# Patient Record
Sex: FEMALE | HISPANIC OR LATINO | Employment: OTHER | URBAN - METROPOLITAN AREA
[De-identification: names, ages, dates, MRNs, and addresses within clinical notes are randomized per-mention and may not be internally consistent; named-entity substitution may affect disease eponyms.]

---

## 2023-08-19 ENCOUNTER — HOSPITAL ENCOUNTER (OUTPATIENT)
Facility: CLINIC | Age: 78
Setting detail: OBSERVATION
Discharge: HOME | End: 2023-08-20
Attending: EMERGENCY MEDICINE | Admitting: STUDENT IN AN ORGANIZED HEALTH CARE EDUCATION/TRAINING PROGRAM
Payer: COMMERCIAL

## 2023-08-19 ENCOUNTER — OFFICE VISIT (OUTPATIENT)
Dept: OPHTHALMOLOGY | Facility: CLINIC | Age: 78
End: 2023-08-19
Payer: COMMERCIAL

## 2023-08-19 DIAGNOSIS — L03.213 PRESEPTAL CELLULITIS OF RIGHT EYE: Primary | ICD-10-CM

## 2023-08-19 LAB
ALBUMIN SERPL BCG-MCNC: 4.8 G/DL (ref 3.5–5.2)
ALP SERPL-CCNC: 156 U/L (ref 35–104)
ALT SERPL W P-5'-P-CCNC: 28 U/L (ref 0–50)
ANION GAP SERPL CALCULATED.3IONS-SCNC: 15 MMOL/L (ref 7–15)
AST SERPL W P-5'-P-CCNC: 28 U/L (ref 0–45)
BASOPHILS # BLD AUTO: 0 10E3/UL (ref 0–0.2)
BASOPHILS NFR BLD AUTO: 0 %
BILIRUB SERPL-MCNC: 0.4 MG/DL
BUN SERPL-MCNC: 10.3 MG/DL (ref 8–23)
CALCIUM SERPL-MCNC: 9.9 MG/DL (ref 8.8–10.2)
CHLORIDE SERPL-SCNC: 99 MMOL/L (ref 98–107)
CREAT SERPL-MCNC: 0.76 MG/DL (ref 0.51–0.95)
DEPRECATED HCO3 PLAS-SCNC: 23 MMOL/L (ref 22–29)
EOSINOPHIL # BLD AUTO: 0.2 10E3/UL (ref 0–0.7)
EOSINOPHIL NFR BLD AUTO: 2 %
ERYTHROCYTE [DISTWIDTH] IN BLOOD BY AUTOMATED COUNT: 13.4 % (ref 10–15)
GFR SERPL CREATININE-BSD FRML MDRD: 79 ML/MIN/1.73M2
GLUCOSE SERPL-MCNC: 152 MG/DL (ref 70–99)
HCT VFR BLD AUTO: 40.7 % (ref 35–47)
HGB BLD-MCNC: 13.3 G/DL (ref 11.7–15.7)
HOLD SPECIMEN: NORMAL
HOLD SPECIMEN: NORMAL
IMM GRANULOCYTES # BLD: 0 10E3/UL
IMM GRANULOCYTES NFR BLD: 0 %
LYMPHOCYTES # BLD AUTO: 2.3 10E3/UL (ref 0.8–5.3)
LYMPHOCYTES NFR BLD AUTO: 30 %
MCH RBC QN AUTO: 29.8 PG (ref 26.5–33)
MCHC RBC AUTO-ENTMCNC: 32.7 G/DL (ref 31.5–36.5)
MCV RBC AUTO: 91 FL (ref 78–100)
MONOCYTES # BLD AUTO: 0.6 10E3/UL (ref 0–1.3)
MONOCYTES NFR BLD AUTO: 8 %
NEUTROPHILS # BLD AUTO: 4.7 10E3/UL (ref 1.6–8.3)
NEUTROPHILS NFR BLD AUTO: 60 %
NRBC # BLD AUTO: 0 10E3/UL
NRBC BLD AUTO-RTO: 0 /100
PLATELET # BLD AUTO: 331 10E3/UL (ref 150–450)
POTASSIUM SERPL-SCNC: 4.1 MMOL/L (ref 3.4–5.3)
PROT SERPL-MCNC: 8.7 G/DL (ref 6.4–8.3)
RBC # BLD AUTO: 4.46 10E6/UL (ref 3.8–5.2)
SODIUM SERPL-SCNC: 137 MMOL/L (ref 136–145)
WBC # BLD AUTO: 7.8 10E3/UL (ref 4–11)

## 2023-08-19 PROCEDURE — 80053 COMPREHEN METABOLIC PANEL: CPT | Performed by: EMERGENCY MEDICINE

## 2023-08-19 PROCEDURE — 85025 COMPLETE CBC W/AUTO DIFF WBC: CPT | Performed by: EMERGENCY MEDICINE

## 2023-08-19 PROCEDURE — 250N000013 HC RX MED GY IP 250 OP 250 PS 637

## 2023-08-19 PROCEDURE — 36415 COLL VENOUS BLD VENIPUNCTURE: CPT | Performed by: EMERGENCY MEDICINE

## 2023-08-19 PROCEDURE — 96365 THER/PROPH/DIAG IV INF INIT: CPT

## 2023-08-19 PROCEDURE — 250N000011 HC RX IP 250 OP 636: Performed by: EMERGENCY MEDICINE

## 2023-08-19 PROCEDURE — 99285 EMERGENCY DEPT VISIT HI MDM: CPT | Performed by: EMERGENCY MEDICINE

## 2023-08-19 PROCEDURE — 99207 PR SERVICE NOT STAFFED W/SUPERV PROV: CPT | Mod: GC | Performed by: STUDENT IN AN ORGANIZED HEALTH CARE EDUCATION/TRAINING PROGRAM

## 2023-08-19 PROCEDURE — 120N000002 HC R&B MED SURG/OB UMMC

## 2023-08-19 PROCEDURE — 87040 BLOOD CULTURE FOR BACTERIA: CPT | Performed by: EMERGENCY MEDICINE

## 2023-08-19 PROCEDURE — 250N000011 HC RX IP 250 OP 636

## 2023-08-19 PROCEDURE — 99222 1ST HOSP IP/OBS MODERATE 55: CPT | Mod: GC | Performed by: HOSPITALIST

## 2023-08-19 PROCEDURE — 250N000009 HC RX 250

## 2023-08-19 RX ORDER — DEXTROSE MONOHYDRATE 25 G/50ML
25-50 INJECTION, SOLUTION INTRAVENOUS
Status: DISCONTINUED | OUTPATIENT
Start: 2023-08-19 | End: 2023-08-20 | Stop reason: HOSPADM

## 2023-08-19 RX ORDER — GLIMEPIRIDE 1 MG/1
1 TABLET ORAL
COMMUNITY
Start: 2023-03-28

## 2023-08-19 RX ORDER — LATANOPROST 50 UG/ML
1 SOLUTION/ DROPS OPHTHALMIC AT BEDTIME
COMMUNITY
Start: 2023-08-16

## 2023-08-19 RX ORDER — ACETAMINOPHEN 500 MG
500-1000 TABLET ORAL EVERY 6 HOURS PRN
COMMUNITY

## 2023-08-19 RX ORDER — FAMOTIDINE 40 MG/1
1 TABLET, FILM COATED ORAL DAILY
COMMUNITY
Start: 2023-03-28

## 2023-08-19 RX ORDER — ATORVASTATIN CALCIUM 20 MG/1
20 TABLET, FILM COATED ORAL EVERY EVENING
Status: DISCONTINUED | OUTPATIENT
Start: 2023-08-19 | End: 2023-08-20

## 2023-08-19 RX ORDER — LOSARTAN POTASSIUM 50 MG/1
1 TABLET ORAL DAILY
COMMUNITY
Start: 2023-03-28

## 2023-08-19 RX ORDER — LOSARTAN POTASSIUM 50 MG/1
50 TABLET ORAL DAILY
Status: DISCONTINUED | OUTPATIENT
Start: 2023-08-20 | End: 2023-08-20 | Stop reason: HOSPADM

## 2023-08-19 RX ORDER — ONDANSETRON 4 MG/1
4 TABLET, ORALLY DISINTEGRATING ORAL EVERY 6 HOURS PRN
Status: DISCONTINUED | OUTPATIENT
Start: 2023-08-19 | End: 2023-08-20 | Stop reason: HOSPADM

## 2023-08-19 RX ORDER — SULFAMETHOXAZOLE/TRIMETHOPRIM 800-160 MG
160 TABLET ORAL
COMMUNITY
Start: 2023-08-18 | End: 2023-08-28

## 2023-08-19 RX ORDER — POLYETHYLENE GLYCOL 3350 17 G/17G
17 POWDER, FOR SOLUTION ORAL DAILY PRN
Status: DISCONTINUED | OUTPATIENT
Start: 2023-08-19 | End: 2023-08-20 | Stop reason: HOSPADM

## 2023-08-19 RX ORDER — ALENDRONATE SODIUM 70 MG/1
70 TABLET ORAL
COMMUNITY
Start: 2023-03-28

## 2023-08-19 RX ORDER — ACETAMINOPHEN 325 MG/1
650 TABLET ORAL EVERY 6 HOURS PRN
Status: DISCONTINUED | OUTPATIENT
Start: 2023-08-19 | End: 2023-08-20 | Stop reason: HOSPADM

## 2023-08-19 RX ORDER — AMPICILLIN AND SULBACTAM 2; 1 G/1; G/1
3 INJECTION, POWDER, FOR SOLUTION INTRAMUSCULAR; INTRAVENOUS ONCE
Status: COMPLETED | OUTPATIENT
Start: 2023-08-19 | End: 2023-08-19

## 2023-08-19 RX ORDER — CETIRIZINE HYDROCHLORIDE 10 MG/1
1 TABLET ORAL DAILY
COMMUNITY
Start: 2023-03-28

## 2023-08-19 RX ORDER — NICOTINE POLACRILEX 4 MG
15-30 LOZENGE BUCCAL
Status: DISCONTINUED | OUTPATIENT
Start: 2023-08-19 | End: 2023-08-20 | Stop reason: HOSPADM

## 2023-08-19 RX ORDER — BRIMONIDINE TARTRATE 2 MG/ML
1 SOLUTION/ DROPS OPHTHALMIC 2 TIMES DAILY
Status: DISCONTINUED | OUTPATIENT
Start: 2023-08-19 | End: 2023-08-20 | Stop reason: HOSPADM

## 2023-08-19 RX ORDER — LATANOPROST 50 UG/ML
1 SOLUTION/ DROPS OPHTHALMIC EVERY EVENING
Status: DISCONTINUED | OUTPATIENT
Start: 2023-08-19 | End: 2023-08-20 | Stop reason: HOSPADM

## 2023-08-19 RX ORDER — DORZOLAMIDE HYDROCHLORIDE AND TIMOLOL MALEATE 20; 5 MG/ML; MG/ML
1 SOLUTION/ DROPS OPHTHALMIC 2 TIMES DAILY
Status: DISCONTINUED | OUTPATIENT
Start: 2023-08-19 | End: 2023-08-20 | Stop reason: HOSPADM

## 2023-08-19 RX ORDER — ISOSORBIDE MONONITRATE 30 MG/1
30 TABLET, EXTENDED RELEASE ORAL
COMMUNITY
Start: 2023-03-28

## 2023-08-19 RX ORDER — AMPICILLIN AND SULBACTAM 2; 1 G/1; G/1
3 INJECTION, POWDER, FOR SOLUTION INTRAMUSCULAR; INTRAVENOUS EVERY 6 HOURS
Status: DISCONTINUED | OUTPATIENT
Start: 2023-08-19 | End: 2023-08-20 | Stop reason: HOSPADM

## 2023-08-19 RX ORDER — ONDANSETRON 2 MG/ML
4 INJECTION INTRAMUSCULAR; INTRAVENOUS EVERY 6 HOURS PRN
Status: DISCONTINUED | OUTPATIENT
Start: 2023-08-19 | End: 2023-08-20 | Stop reason: HOSPADM

## 2023-08-19 RX ORDER — DORZOLAMIDE HYDROCHLORIDE AND TIMOLOL MALEATE 20; 5 MG/ML; MG/ML
1 SOLUTION/ DROPS OPHTHALMIC 2 TIMES DAILY
COMMUNITY
Start: 2023-02-03

## 2023-08-19 RX ORDER — ATORVASTATIN CALCIUM 40 MG/1
1 TABLET, FILM COATED ORAL DAILY
COMMUNITY
Start: 2023-03-28

## 2023-08-19 RX ORDER — FAMOTIDINE 20 MG/1
40 TABLET, FILM COATED ORAL 2 TIMES DAILY
Status: DISCONTINUED | OUTPATIENT
Start: 2023-08-19 | End: 2023-08-20 | Stop reason: HOSPADM

## 2023-08-19 RX ORDER — LIDOCAINE 40 MG/G
CREAM TOPICAL
Status: DISCONTINUED | OUTPATIENT
Start: 2023-08-19 | End: 2023-08-20 | Stop reason: HOSPADM

## 2023-08-19 RX ORDER — BRIMONIDINE TARTRATE 2 MG/ML
1 SOLUTION/ DROPS OPHTHALMIC 2 TIMES DAILY
COMMUNITY

## 2023-08-19 RX ORDER — ASPIRIN 81 MG/1
81 TABLET, CHEWABLE ORAL DAILY
Status: DISCONTINUED | OUTPATIENT
Start: 2023-08-20 | End: 2023-08-20 | Stop reason: HOSPADM

## 2023-08-19 RX ORDER — GLIMEPIRIDE 1 MG/1
1 TABLET ORAL
Status: DISCONTINUED | OUTPATIENT
Start: 2023-08-20 | End: 2023-08-20 | Stop reason: HOSPADM

## 2023-08-19 RX ORDER — METOPROLOL TARTRATE 50 MG
TABLET ORAL
COMMUNITY
Start: 2023-04-04

## 2023-08-19 RX ORDER — METOPROLOL TARTRATE 25 MG/1
50 TABLET, FILM COATED ORAL 2 TIMES DAILY
Status: DISCONTINUED | OUTPATIENT
Start: 2023-08-20 | End: 2023-08-20 | Stop reason: HOSPADM

## 2023-08-19 RX ORDER — ISOSORBIDE MONONITRATE 30 MG/1
30 TABLET, EXTENDED RELEASE ORAL DAILY
Status: DISCONTINUED | OUTPATIENT
Start: 2023-08-20 | End: 2023-08-20 | Stop reason: HOSPADM

## 2023-08-19 RX ORDER — NITROGLYCERIN 0.4 MG/1
0.4 TABLET SUBLINGUAL EVERY 5 MIN PRN
COMMUNITY

## 2023-08-19 RX ORDER — CALCIUM CARBONATE 500 MG/1
1500 TABLET, CHEWABLE ORAL DAILY PRN
Status: DISCONTINUED | OUTPATIENT
Start: 2023-08-19 | End: 2023-08-20 | Stop reason: HOSPADM

## 2023-08-19 RX ORDER — ASPIRIN 81 MG/1
81 TABLET ORAL
COMMUNITY
Start: 2023-05-12

## 2023-08-19 RX ADMIN — BRIMONIDINE TARTRATE 1 DROP: 2 SOLUTION OPHTHALMIC at 20:15

## 2023-08-19 RX ADMIN — DORZOLAMIDE HYDROCHLORIDE AND TIMOLOL MALEATE 1 DROP: 22.3; 6.8 SOLUTION/ DROPS OPHTHALMIC at 20:15

## 2023-08-19 RX ADMIN — METFORMIN HYDROCHLORIDE 1000 MG: 500 TABLET, FILM COATED ORAL at 20:13

## 2023-08-19 RX ADMIN — LATANOPROST 1 DROP: 50 SOLUTION OPHTHALMIC at 20:15

## 2023-08-19 RX ADMIN — ATORVASTATIN CALCIUM 20 MG: 20 TABLET, FILM COATED ORAL at 20:14

## 2023-08-19 RX ADMIN — AMPICILLIN SODIUM AND SULBACTAM SODIUM 3 G: 2; 1 INJECTION, POWDER, FOR SOLUTION INTRAMUSCULAR; INTRAVENOUS at 15:21

## 2023-08-19 RX ADMIN — FAMOTIDINE 40 MG: 20 TABLET ORAL at 20:13

## 2023-08-19 RX ADMIN — AMPICILLIN SODIUM AND SULBACTAM SODIUM 3 G: 2; 1 INJECTION, POWDER, FOR SOLUTION INTRAMUSCULAR; INTRAVENOUS at 20:17

## 2023-08-19 ASSESSMENT — ACTIVITIES OF DAILY LIVING (ADL)
ADLS_ACUITY_SCORE: 33
DIFFICULTY_EATING/SWALLOWING: NO
DOING_ERRANDS_INDEPENDENTLY_DIFFICULTY: YES
WEAR_GLASSES_OR_BLIND: YES
CONCENTRATING,_REMEMBERING_OR_MAKING_DECISIONS_DIFFICULTY: NO
WALKING_OR_CLIMBING_STAIRS_DIFFICULTY: NO
CHANGE_IN_FUNCTIONAL_STATUS_SINCE_ONSET_OF_CURRENT_ILLNESS/INJURY: NO
VISION_MANAGEMENT: READING GLASSES
DRESSING/BATHING_DIFFICULTY: NO
ADLS_ACUITY_SCORE: 35
FALL_HISTORY_WITHIN_LAST_SIX_MONTHS: NO
ADLS_ACUITY_SCORE: 35
ADLS_ACUITY_SCORE: 22
ADLS_ACUITY_SCORE: 35
TOILETING_ISSUES: NO

## 2023-08-19 ASSESSMENT — TONOMETRY
IOP_METHOD: TONOPEN
OD_IOP_MMHG: 17
OS_IOP_MMHG: 20

## 2023-08-19 ASSESSMENT — CONF VISUAL FIELD
OS_NORMAL: 1
OS_INFERIOR_NASAL_RESTRICTION: 0
OS_INFERIOR_TEMPORAL_RESTRICTION: 0
OD_INFERIOR_NASAL_RESTRICTION: 0
OD_SUPERIOR_NASAL_RESTRICTION: 0
OD_SUPERIOR_TEMPORAL_RESTRICTION: 0
OD_INFERIOR_TEMPORAL_RESTRICTION: 0
METHOD: COUNTING FINGERS
OD_NORMAL: 1
OS_SUPERIOR_NASAL_RESTRICTION: 0
OS_SUPERIOR_TEMPORAL_RESTRICTION: 0

## 2023-08-19 ASSESSMENT — VISUAL ACUITY
OD_SC+: -2
OS_SC: 20/30
OS_SC+: -2
METHOD: SNELLEN - LINEAR
OD_SC: 20/40

## 2023-08-19 ASSESSMENT — SLIT LAMP EXAM - LIDS
COMMENTS: BLEPHARITIS, MGD
COMMENTS: BLEPHARITIS, MGD

## 2023-08-19 ASSESSMENT — EXTERNAL EXAM - LEFT EYE: OS_EXAM: NORMAL

## 2023-08-19 ASSESSMENT — EXTERNAL EXAM - RIGHT EYE: OD_EXAM: NORMAL

## 2023-08-19 NOTE — ED TRIAGE NOTES
78 yr old female ambulatory to triage presenting with right eye concern. In [facility] ED yesterday - followed up in optho clinic today - referred to ED for increased left eye redness, swelling. Denies visual disturbance.     Triage Assessment       Row Name 08/19/23 1311       Triage Assessment (Adult)    Airway WDL WDL       Respiratory WDL    Respiratory WDL WDL       Skin Circulation/Temperature WDL    Skin Circulation/Temperature WDL WDL       Cardiac WDL    Cardiac WDL X  HTN       Peripheral/Neurovascular WDL    Peripheral Neurovascular WDL WDL       Cognitive/Neuro/Behavioral WDL    Cognitive/Neuro/Behavioral WDL WDL

## 2023-08-19 NOTE — ED PROVIDER NOTES
[place] EMERGENCY DEPARTMENT ([facility])    8/19/23       ED PROVIDER NOTE    History   No chief complaint on file.    HPI  Saumya Holliday is a 78 year old Italian-speaking female with past medical history of DM2, mitral valve insufficiency, and osteoporosis who presents to the emergency department for evaluation of R eye redness and mass.      Patient was seen at [facility] ED yesterday and diagnosed with preseptal cellulitis and dacryocystitis of right lacrimal sac. They discharged her with plan to follow up with ophthalmology here at [facility]. Per chart review patient was sent to the emergency department by our ophthalmology department earlier this morning for initiation of broad spectrum antibiotics and hospital admission. They noted that she does not need to be NPO at the time.     Past Medical History  No past medical history on file.  No past surgical history on file.  No current outpatient medications on file.    Not on File  Family History  No family history on file.  Social History       Past medical history, past surgical history, medications, allergies, family history, and social history were reviewed with the patient. No additional pertinent items.      A medically appropriate review of systems was performed with pertinent positives and negatives noted in the HPI, and all other systems negative.    Physical Exam        Physical Exam  No acute distress, breathing comfortably   +redness surrounding right orbit, lashes normal  + Tender mass on medial canthus of right eye without drainage  Pupils dilated bilaterally  EOMI      ED Course, Procedures, & Data      Procedures                 No results found for any visits on 08/19/23.  Medications - No data to display  Labs Ordered and Resulted from Time of ED Arrival to Time of ED Departure - No data to display  No orders to display          Critical care was not performed.     Medical Decision Making  The patient's presentation was of high  complexity (an acute health issue posing potential threat to life or bodily function).    The patient's evaluation involved:  review of external note(s) from 1 sources (ophthalmology)  ordering and/or review of 3+ test(s) in this encounter (see separate area of note for details)  discussion of management or test interpretation with another health professional (ophthalmology)    The patient's management necessitated high risk (a decision regarding hospitalization).    Assessment & Plan      Concern for worsening preseptal cellulitis requiring IV antibiotics as well as a abscess related to the right eye lids or tear ducts requiring ophthalmology bedside procedure  -Consult ophthalmology, admit to medicine  -IV antibiotics, labs  -I have reviewed the report of CT orbits performed yesterday at outside hospital    I have reviewed the nursing notes. I have reviewed the findings, diagnosis, plan and need for follow up with the patient.    New Prescriptions    No medications on file       Final diagnoses:   None     Henry Donaldson MD  [facility] EMERGENCY DEPARTMENT  8/19/2023     Henry Donaldson MD  08/19/23 2028

## 2023-08-19 NOTE — H&P
[facility]    History and Physical - Hospitalist Service, GOLD TEAM        Date of Admission:  8/19/2023    Assessment & Plan      Saumya Holliday is a 78 year old female admitted on 8/19/2023. She has a PMHx of glaucoma, bilateral cataract surgery, HTN, DMII not on insulin, osteoporosis, CAD s/p stent RCA 2007,  of LAD, NANCY LCX 2016, moderate MR. Presenting with R eye swelling, concern for infection.     # Preseptal cellulitis, right eye, stage I/II  # Hx glaucoma  # Hx bilateral cataract surgery 2022  Approx 1 week hx of R eye swelling with particular confluence of swelling near R medial canthus. No pain reported. No trauma to area. CT w soft tissue/skin thickening anterior to the right orbit with another area with peripheral enhancement along medial canthus of R eye. Of note, mild extraconal medial right orbital fat stranding, however no intraconal retro-orbital mass, fluid, or inflammation.  - Ophthalmology following, appreciate recs    - considering drainage of fluid pocket along medial canthus   - Not NPO, eating normally  - continue PTA eyedrops: alphagan, cosopt, and xalatan   - Unasyn 3g q6hrs  - if doesn't respond, consider adding antifungal in context of DM  - follow up on BCx    #HTN  - PTA metoprolol tartrate 50mg daily   - PTA losartan 50mg daily   - PTA Imdur 30mg daily     #DMII   Not on insulin. HbA1c 7/17 7.2.  - PTA glimepiride + metformin   - monitor BG    #Increased Alkaline Phosphatase  LFTs wnl. Suspect this is in context of known osteoporosis.     #Osteoporosis  - PTA alendronate 70mg  - PTA calcium carbonate 1500mg     #CAD s/p stent RCA 2007,  of LAD, NANCY LCX 2016  #Moderate MR  - PTA ASA 81mg     #GERD  - PTA famotidine 40mg daily        Diet: Combination Diet Regular Diet Adult  DVT Prophylaxis: Pneumatic Compression Devices  Christ[place]on Catheter: Not present  Lines: None     Cardiac Monitoring: None  Code Status: Full Code    Clinically  Significant Risk Factors Present on Admission                       # Overweight: Estimated body mass index is 26.95 kg/m  as calculated from the following:    Height as of this encounter: 1.524 m (5').    Weight as of this encounter: 62.6 kg (138 lb).              Disposition Plan      Expected Discharge Date: 08/21/2023                The patient's care was discussed with the Attending Physician, Dr. Abdi .    Teresa Heart MD  Hospitalist Service, [facility]  Securely message with Roc2Locmore info)  Text page via [facility] Paging/Directory   See signed in provider for up to date coverage information    ______________________________________________________________________    Chief Complaint   R eye swelling    History is obtained from the patient, patient's daughter.    History of Present Illness   Saumya Holliday is a 78 year old female admitted on 8/19/2023. She has a PMHx of glaucoma, bilateral cataract surgery, HTN, DMII not on insulin, osteoporosis, CAD s/p stent RCA 2007,  of LAD, NANCY LCX 2016, moderate MR. Presenting with R eye swelling, concern for infection.     Pt reports that 7 days ago, she started developing R eye swelling, with worsening confluence of swelling along R medial canthus. She did not take any medications for the pain. No trauma to the area that she remembers. No fevers, chills.     Reported to [facility] ED day prior to admission, dx with preseptal cellulitis & dacrocystitis of R lacrimal sac, given Augmentin + bactrim. CT performed there showed soft tissue/skin thickening anterior to the right orbit with another area with peripheral enhancement along medial canthus of R eye, mild extraconal medial right orbital fat stranding, as well as mucosal retention cyst in the anterior aspect of the right maxillary sinus with moderate peripheral mucosal thickening in the bilateral ethmoid air cells.     [facility]- they did not have abscess to [employer] scan, worried about abscess over R eye, referred pt to ED for initiation of broad spectrum antibiotics and inpt optho consult.     Of note, she had a similar swelling presentation in May of this year, treated as allergies. Also, she had cataract surgery in 2022 and since then has had ongoing watery eyes, R side worse.     In ED, pt is afebrile, HR in 90s. Started on Unasyn. CMP with normal Na, K, notable for increased alk phosphatase at 156. CBC wnl. Bcx sent      Past Medical History    History reviewed. No pertinent past medical history.    Past Surgical History   History reviewed. No pertinent surgical history.    Prior to Admission Medications   None        Review of Systems    The 10 point Review of Systems is negative other than noted in the HPI or here.        Physical Exam   Vital Signs: Temp: 98.1  F (36.7  C) Temp src: Oral BP: 120/71 Pulse: 91   Resp: 18 SpO2: 98 % O2 Device: None (Room air)    Weight: 138 lbs 0 oz    General Appearance: Nontoxic appearance, no distress  HEENT: R eye orbit erythematous, R medial canthus with erythematous mass, no tenderness, eyes are watering R>L, Full EOM  Respiratory: CTAB  Cardiovascular: RRR, 2/6 holosystolic murmur, no LE edema  GI: No distension   Skin: No rashes  Other: A&Ox3     Medical Decision Making           Data   ------------------------- PAST 24 HR DATA REVIEWED -----------------------------------------------

## 2023-08-19 NOTE — PROGRESS NOTES
"    Ophthalmology Acute Clinic       HPI:   Saumya Holliday is a 78 year old female who presents for right medial canthus swelling. This began two months ago with a rash along the right medial canthus associated with some swelling. The patient was seen in an emergency department and was treated for \"allergies\". This swelling subsequently improved.     About 6 days ago, the patient began to notice swelling in the same area. It has continued to worsen over the week. The patient went to the ED last night, and was referred here for further evaluation.     The patient denies any changes in vision. No pain. She endorses tearing, but no discharge.       Past Ocular history:   - Glasses: Cheaters  - Contact lens wear: No  - Ocular medical history: Glaucoma, pseudophakia  - Ocular surgical history: Cataract surgery each eye 2022  - Current eye drops: alphagan, cosopt, and xalatan     PMH:   No past medical history on file.      FH: No family history of glaucoma, AMD, or other ocular disease      Review of systems for the eyes was negative other than the pertinent positives/negatives listed in the HPI.          Assessment & Plan      Saumya Holliday is a 78 year old female with the following diagnoses:     # Acute dacryocystitis with concern for abscess, right eye   # ?Preseptal cellulitis, right eye   - One week of right eyelid swelling and mass  - VA 20/40 right eye, at baseline per pt  - IOP wnl (pt has a history of glaucoma, on max drops)  - EOMs and VF full  - Exam shows a fluctuant crusted erythematous mass at the right medial canthus, with surrounding erythema along the upper and lower eyelids.   - Concerns include dacrocystitis with abscess formation, canaliculitis, preseptal cellulitis. Appears to be above the medial canthus tendon, which would be unusual for dacrocystitis. The area is painless, and there is no punctal pouting which makes canaliculitis less likely. Given that the fluid pocket appears so superficial, we " considered drainage at the bedside. However, without being able to access the imaging completed at [facility], we feel that this decision should be deferred until we have radiologic confirmation of the abscess.   - Some mild massage pressure was applied to the abscess to assess for drainage from the puncta, however there was a superficial pustule forming on the epithelium and the patient endorsed mild discomfort, so this was aborted.     Plan:   - Send patient to ED for initiation of broad spectrum antibiotics, will defer to ED   - Requested that ED pull images from [facility]. If unable to do so, recommend re-scanning with CT orbits w and w/o contrast.   - Admit patient to hospital  - Ophthalmology will follow closely, possible drainage at bedside tomorrow  - Patient does not need to be NPO at this time      Follow up:  Ophthalmology will continue to follow while inpatient    Patient discussed with Dr. Mcneal    Thank you for entrusting us with your care    Thuan Chan M.D.  PGY-2 Resident Physician  Department of Ophthalmology  08/19/23 11:01 AM    Attending Physician Attestation:  I did not see this patient on Aug 19, 2023, but I have reviewed the relevant history, examination findings, assessment, and plan as documented by others.  I have confirmed and edited as necessary the assessment and plan and agree with this note.  - Velma Mcneal MD 11:26 AM 8/23/2023

## 2023-08-20 ENCOUNTER — APPOINTMENT (OUTPATIENT)
Dept: CT IMAGING | Facility: CLINIC | Age: 78
End: 2023-08-20
Attending: STUDENT IN AN ORGANIZED HEALTH CARE EDUCATION/TRAINING PROGRAM
Payer: COMMERCIAL

## 2023-08-20 VITALS
OXYGEN SATURATION: 98 % | TEMPERATURE: 98.2 F | WEIGHT: 138 LBS | DIASTOLIC BLOOD PRESSURE: 72 MMHG | SYSTOLIC BLOOD PRESSURE: 139 MMHG | BODY MASS INDEX: 27.09 KG/M2 | HEART RATE: 82 BPM | HEIGHT: 60 IN | RESPIRATION RATE: 16 BRPM

## 2023-08-20 LAB
ALBUMIN SERPL BCG-MCNC: 3.6 G/DL (ref 3.5–5.2)
ALP SERPL-CCNC: 120 U/L (ref 35–104)
ALT SERPL W P-5'-P-CCNC: 22 U/L (ref 0–50)
ANION GAP SERPL CALCULATED.3IONS-SCNC: 11 MMOL/L (ref 7–15)
AST SERPL W P-5'-P-CCNC: 21 U/L (ref 0–45)
BILIRUB SERPL-MCNC: 0.4 MG/DL
BUN SERPL-MCNC: 12.3 MG/DL (ref 8–23)
CALCIUM SERPL-MCNC: 9 MG/DL (ref 8.8–10.2)
CHLORIDE SERPL-SCNC: 105 MMOL/L (ref 98–107)
CREAT SERPL-MCNC: 0.74 MG/DL (ref 0.51–0.95)
DEPRECATED HCO3 PLAS-SCNC: 25 MMOL/L (ref 22–29)
ERYTHROCYTE [DISTWIDTH] IN BLOOD BY AUTOMATED COUNT: 13.2 % (ref 10–15)
GFR SERPL CREATININE-BSD FRML MDRD: 82 ML/MIN/1.73M2
GLUCOSE SERPL-MCNC: 165 MG/DL (ref 70–99)
HCT VFR BLD AUTO: 36.8 % (ref 35–47)
HGB BLD-MCNC: 12.2 G/DL (ref 11.7–15.7)
MCH RBC QN AUTO: 30 PG (ref 26.5–33)
MCHC RBC AUTO-ENTMCNC: 33.2 G/DL (ref 31.5–36.5)
MCV RBC AUTO: 91 FL (ref 78–100)
PLATELET # BLD AUTO: 268 10E3/UL (ref 150–450)
POTASSIUM SERPL-SCNC: 3.8 MMOL/L (ref 3.4–5.3)
PROT SERPL-MCNC: 6.6 G/DL (ref 6.4–8.3)
RBC # BLD AUTO: 4.06 10E6/UL (ref 3.8–5.2)
SODIUM SERPL-SCNC: 141 MMOL/L (ref 136–145)
WBC # BLD AUTO: 6 10E3/UL (ref 4–11)

## 2023-08-20 PROCEDURE — 36415 COLL VENOUS BLD VENIPUNCTURE: CPT | Performed by: INTERNAL MEDICINE

## 2023-08-20 PROCEDURE — 96376 TX/PRO/DX INJ SAME DRUG ADON: CPT | Mod: 59

## 2023-08-20 PROCEDURE — 80053 COMPREHEN METABOLIC PANEL: CPT | Performed by: INTERNAL MEDICINE

## 2023-08-20 PROCEDURE — 99239 HOSP IP/OBS DSCHRG MGMT >30: CPT | Performed by: STUDENT IN AN ORGANIZED HEALTH CARE EDUCATION/TRAINING PROGRAM

## 2023-08-20 PROCEDURE — 250N000009 HC RX 250: Performed by: STUDENT IN AN ORGANIZED HEALTH CARE EDUCATION/TRAINING PROGRAM

## 2023-08-20 PROCEDURE — 250N000013 HC RX MED GY IP 250 OP 250 PS 637

## 2023-08-20 PROCEDURE — 99207 PR NO BILLABLE SERVICE THIS VISIT: CPT | Performed by: STUDENT IN AN ORGANIZED HEALTH CARE EDUCATION/TRAINING PROGRAM

## 2023-08-20 PROCEDURE — 250N000011 HC RX IP 250 OP 636: Mod: JZ | Performed by: STUDENT IN AN ORGANIZED HEALTH CARE EDUCATION/TRAINING PROGRAM

## 2023-08-20 PROCEDURE — 85027 COMPLETE CBC AUTOMATED: CPT | Performed by: INTERNAL MEDICINE

## 2023-08-20 PROCEDURE — 250N000011 HC RX IP 250 OP 636

## 2023-08-20 PROCEDURE — 70481 CT ORBIT/EAR/FOSSA W/DYE: CPT

## 2023-08-20 PROCEDURE — 70481 CT ORBIT/EAR/FOSSA W/DYE: CPT | Mod: 26 | Performed by: RADIOLOGY

## 2023-08-20 RX ORDER — ATORVASTATIN CALCIUM 40 MG/1
40 TABLET, FILM COATED ORAL EVERY EVENING
Status: DISCONTINUED | OUTPATIENT
Start: 2023-08-20 | End: 2023-08-20 | Stop reason: HOSPADM

## 2023-08-20 RX ORDER — IOPAMIDOL 755 MG/ML
100 INJECTION, SOLUTION INTRAVASCULAR ONCE
Status: COMPLETED | OUTPATIENT
Start: 2023-08-20 | End: 2023-08-20

## 2023-08-20 RX ADMIN — SODIUM CHLORIDE 50 ML: 9 INJECTION, SOLUTION INTRAVENOUS at 11:23

## 2023-08-20 RX ADMIN — ISOSORBIDE MONONITRATE 30 MG: 30 TABLET, EXTENDED RELEASE ORAL at 09:15

## 2023-08-20 RX ADMIN — GLIMEPIRIDE 1 MG: 1 TABLET ORAL at 09:16

## 2023-08-20 RX ADMIN — IOPAMIDOL 74 ML: 755 INJECTION, SOLUTION INTRAVENOUS at 11:22

## 2023-08-20 RX ADMIN — AMPICILLIN SODIUM AND SULBACTAM SODIUM 3 G: 2; 1 INJECTION, POWDER, FOR SOLUTION INTRAMUSCULAR; INTRAVENOUS at 08:41

## 2023-08-20 RX ADMIN — AMPICILLIN SODIUM AND SULBACTAM SODIUM 3 G: 2; 1 INJECTION, POWDER, FOR SOLUTION INTRAMUSCULAR; INTRAVENOUS at 03:39

## 2023-08-20 RX ADMIN — AMPICILLIN SODIUM AND SULBACTAM SODIUM 3 G: 2; 1 INJECTION, POWDER, FOR SOLUTION INTRAMUSCULAR; INTRAVENOUS at 15:08

## 2023-08-20 RX ADMIN — DORZOLAMIDE HYDROCHLORIDE AND TIMOLOL MALEATE 1 DROP: 22.3; 6.8 SOLUTION/ DROPS OPHTHALMIC at 09:18

## 2023-08-20 RX ADMIN — BRIMONIDINE TARTRATE 1 DROP: 2 SOLUTION OPHTHALMIC at 09:18

## 2023-08-20 RX ADMIN — METOPROLOL TARTRATE 50 MG: 25 TABLET, FILM COATED ORAL at 08:41

## 2023-08-20 RX ADMIN — METFORMIN HYDROCHLORIDE 500 MG: 500 TABLET ORAL at 08:40

## 2023-08-20 RX ADMIN — FAMOTIDINE 40 MG: 20 TABLET ORAL at 08:41

## 2023-08-20 RX ADMIN — LOSARTAN POTASSIUM 50 MG: 50 TABLET, FILM COATED ORAL at 08:41

## 2023-08-20 RX ADMIN — ASPIRIN 81 MG CHEWABLE TABLET 81 MG: 81 TABLET CHEWABLE at 08:41

## 2023-08-20 ASSESSMENT — ACTIVITIES OF DAILY LIVING (ADL)
ADLS_ACUITY_SCORE: 26
ADLS_ACUITY_SCORE: 22

## 2023-08-20 NOTE — MEDICATION SCRIBE - ADMISSION MEDICATION HISTORY
Medication Scribe Admission Medication History    Admission medication history is complete. The information provided in this note is only as accurate as the sources available at the time of the update.    Medication reconciliation/reorder completed by provider prior to medication history? No    Information Source(s): Patient, Family member, and Hospital records via in-person    Pertinent Information: Pt's daughter showed her discharge papers with her medications and translated for her mother for questions. Also pt has just started her BACTRIM -160 and her Augmentin 875-125 mg    Changes made to PTA medication list:  Added: All medications  Deleted: None  Changed: None    Medication Affordability: No       Allergies reviewed with patient and updates made in EHR: yes    Medication History Completed By: Nataly Davison 8/19/2023 7:15 PM    Prior to Admission medications    Medication Sig Last Dose Taking? Auth Provider Long Term End Date   acetaminophen (TYLENOL) 500 MG tablet Take 500-1,000 mg by mouth every 6 hours as needed for mild pain 8/16/2023 at unknown Yes Reported, Patient     alendronate (FOSAMAX) 70 MG tablet Take 70 mg by mouth every 7 days Past Week at unknown Yes Reported, Patient Yes    amoxicillin-clavulanate (AUGMENTIN) 875-125 MG tablet Take 1 tablet by mouth 8/18/2023 at pm Yes Reported, Patient  8/28/23   aspirin 81 MG EC tablet Take 81 mg by mouth 8/18/2023 at pm Yes Reported, Patient     atorvastatin (LIPITOR) 40 MG tablet Take 1 tablet by mouth daily 8/18/2023 at pm Yes Reported, Patient     brimonidine (ALPHAGAN) 0.2 % ophthalmic solution Place 1 drop into both eyes 2 times daily 8/18/2023 at pm Yes Reported, Patient     calcium carbonate (OS-SAKINA) 1500 (600 Ca) MG tablet Take 1 tablet by mouth daily 8/18/2023 at pm Yes Reported, Patient     cetirizine (ZYRTEC) 10 MG tablet Take 1 tablet by mouth daily 8/18/2023 at pm Yes Reported, Patient     CHOLECALCIFEROL 25 MCG (1000 UT) tablet Take 25  mcg by mouth 8/18/2023 at pm Yes Reported, Patient     dorzolamide-timolol (COSOPT) 2-0.5 % ophthalmic solution Place 1 drop into both eyes 2 times daily 8/18/2023 at pm Yes Reported, Patient     famotidine (PEPCID) 40 MG tablet Take 1 tablet by mouth daily 8/18/2023 at pm Yes Reported, Patient     glimepiride (AMARYL) 1 MG tablet Take 1 mg by mouth 8/18/2023 at pm Yes Reported, Patient     isosorbide mononitrate (IMDUR) 30 MG 24 hr tablet Take 30 mg by mouth 8/18/2023 at pm Yes Reported, Patient     latanoprost (XALATAN) 0.005 % ophthalmic solution Place 1 drop into both eyes At Bedtime 8/18/2023 at pm Yes Reported, Patient     losartan (COZAAR) 50 MG tablet Take 1 tablet by mouth daily 8/18/2023 at pm Yes Reported, Patient     metFORMIN (GLUCOPHAGE) 500 MG tablet Take 1 tablet before breakfast and 2 before supper 8/18/2023 at pm Yes Reported, Patient     metoprolol tartrate (LOPRESSOR) 50 MG tablet TAKE 1 TABLET BY MOUTH IN THE MORNING AND 1 IN THE EVENING 8/18/2023 at pm Yes Reported, Patient     nitroGLYcerin (NITROSTAT) 0.4 MG sublingual tablet Place 0.4 mg under the tongue every 5 minutes as needed for chest pain For chest pain place 1 tablet under the tongue every 5 minutes for 3 doses. If symptoms persist 5 minutes after 1st dose call 911. More than a month at unknown Yes Reported, Patient Yes    sulfamethoxazole-trimethoprim (BACTRIM DS) 800-160 MG tablet Take 160 mg by mouth 8/18/2023 at pm Yes Reported, Patient  8/28/23

## 2023-08-20 NOTE — DISCHARGE SUMMARY
[facility]  Hospitalist Discharge Summary      Date of Admission:  8/19/2023  Date of Discharge:  No discharge date for patient encounter.  Discharging Provider: Cecille Das MD  Discharge Service: Hospitalist Service, GOLD TEAM 22    Discharge Diagnoses   # Preseptal cellulitis, right eye, stage I/II  # Hx glaucoma  # Hx bilateral cataract surgery 2022    Clinically Significant Risk Factors     # Overweight: Estimated body mass index is 26.95 kg/m  as calculated from the following:    Height as of this encounter: 1.524 m (5').    Weight as of this encounter: 62.6 kg (138 lb).       Follow-ups Needed After Discharge   Follow-up Appointments     Adult [facility] Follow-up and recommended labs and tests      Follow up with primary care provider, Physician No Ref-Primary, within 7   days for hospital follow- up.  No follow up labs or test are needed.      Appointments on [place] and/or [facility] (with [facility] or [facility]   provider or service). Call [phone] if you haven't heard regarding   these appointments within 7 days of discharge.        \    Unresulted Labs Ordered in the Past 30 Days of this Admission       Date and Time Order Name Status Description    8/19/2023  2:37 PM Blood Culture Peripheral Blood Preliminary     8/19/2023  2:37 PM Blood Culture Peripheral Blood Preliminary         These results will be followed up by PCP    Discharge Disposition   Discharged to home  Condition at discharge: Stable    Hospital Course   Saumya Holliday is a 78 year old female admitted on 8/19/2023. She has a PMHx of glaucoma, bilateral cataract surgery, HTN, DMII not on insulin, osteoporosis, CAD s/p stent RCA 2007,  of LAD, NANCY LCX 2016, moderate MR. Presenting with R eye swelling, concern for infection.     Today:  - Abscess drained spontaneously, No I&D required   - repeat orbital CT reviewed by Ophthalmology   - Safe for discharge on Augmentin BID x10 days per  Ophtalmology    - patient has previous script that was never picked up; daughter will  this script for abx    # Preseptal cellulitis, right eye, stage I/II  # Hx glaucoma  # Hx bilateral cataract surgery 2022  Approx 1 week hx of R eye swelling with particular confluence of swelling near R medial canthus. No pain reported. No trauma to area. CT w soft tissue/skin thickening anterior to the right orbit with another area with peripheral enhancement along medial canthus of R eye. Of note, mild extraconal medial right orbital fat stranding, however no intraconal retro-orbital mass, fluid, or inflammation.  - Ophthalmology following, appreciate recs    - considering drainage of fluid pocket along medial canthus   - Not NPO, eating normally  - continue PTA eyedrops: alphagan, cosopt, and xalatan   - Unasyn 3g q6hrs  - if doesn't respond, consider adding antifungal in context of DM  - follow up on BCx    #HTN  - PTA metoprolol tartrate 50mg daily   - PTA losartan 50mg daily   - PTA Imdur 30mg daily     #DMII   Not on insulin. HbA1c 7/17 7.2.  - PTA glimepiride + metformin   - monitor BG    #Increased Alkaline Phosphatase  LFTs wnl. Suspect this is in context of known osteoporosis.     #Osteoporosis  - PTA alendronate 70mg  - PTA calcium carbonate 1500mg     #CAD s/p stent RCA 2007,  of LAD, NANCY LCX 2016  #Moderate MR  - PTA ASA 81mg     #GERD  - PTA famotidine 40mg daily     Consultations This Hospital Stay   OPHTHALMOLOGY IP CONSULT    Code Status   Full Code    Time Spent on this Encounter   I, Cecille Das MD, personally saw the patient today and spent greater than 30 minutes discharging this patient.       Cecille Das MD  formerly [facility]  [address] [place] [id number]  Phone: [phone]  Fax: [phone]  ______________________________________________________________________    Physical Exam   Vital Signs: Temp: 98.2  F (36.8  C) Temp src: Oral BP:  139/72 Pulse: 82   Resp: 16 SpO2: 98 % O2 Device: None (Room air)    Weight: 138 lbs 0 oz    GENERAL: Healthy, alert and no distress  EYES: Eyes grossly normal to inspection.  No discharge or erythema, or obvious scleral/conjunctival abnormalities.  RESP: No audible wheeze, cough, or visible cyanosis.  No visible retractions or increased work of breathing.    SKIN: R eye orbital cellulitis   NEURO: Cranial nerves grossly intact.  Mentation and speech appropriate for age.  PSYCH: Mentation appears normal, affect normal/bright, judgement and insight intact, normal speech and appearance well-groomed.       Primary Care Physician   Physician No Ref-Primary    Discharge Orders      Reason for your hospital stay    Preorbital Cellulitis     Activity    Your activity upon discharge: activity as tolerated     Adult [facility] Follow-up and recommended labs and tests    Follow up with primary care provider, Physician No Ref-Primary, within 7 days for hospital follow- up.  No follow up labs or test are needed.      Appointments on [place] and/or [facility] (with [facility] or [facility] provider or service). Call [phone] if you haven't heard regarding these appointments within 7 days of discharge.     Diet    Follow this diet upon discharge: Orders Placed This Encounter      Combination Diet Regular Diet Adult       Significant Results and Procedures   Most Recent 3 CBC's:  Recent Labs   Lab Test 08/20/23  0558 08/19/23  1514   WBC 6.0 7.8   HGB 12.2 13.3   MCV 91 91    331     Most Recent 3 BMP's:  Recent Labs   Lab Test 08/20/23  0558 08/19/23  1514    137   POTASSIUM 3.8 4.1   CHLORIDE 105 99   CO2 25 23   BUN 12.3 10.3   CR 0.74 0.76   ANIONGAP 11 15   SAKINA 9.0 9.9   * 152*   , No results found for this or any previous visit.    Discharge Medications   Current Discharge Medication List        CONTINUE these medications which have CHANGED    Details   amoxicillin-clavulanate (AUGMENTIN) 875-125 MG tablet  Take 1 tablet by mouth 2 times daily for 10 days  Qty: 20 tablet, Refills: 0    Associated Diagnoses: Preseptal cellulitis of right eye           CONTINUE these medications which have NOT CHANGED    Details   acetaminophen (TYLENOL) 500 MG tablet Take 500-1,000 mg by mouth every 6 hours as needed for mild pain      alendronate (FOSAMAX) 70 MG tablet Take 70 mg by mouth every 7 days      aspirin 81 MG EC tablet Take 81 mg by mouth      atorvastatin (LIPITOR) 40 MG tablet Take 1 tablet by mouth daily      brimonidine (ALPHAGAN) 0.2 % ophthalmic solution Place 1 drop into both eyes 2 times daily      calcium carbonate (OS-SAKINA) 1500 (600 Ca) MG tablet Take 1 tablet by mouth daily      cetirizine (ZYRTEC) 10 MG tablet Take 1 tablet by mouth daily      CHOLECALCIFEROL 25 MCG (1000 UT) tablet Take 25 mcg by mouth      dorzolamide-timolol (COSOPT) 2-0.5 % ophthalmic solution Place 1 drop into both eyes 2 times daily      famotidine (PEPCID) 40 MG tablet Take 1 tablet by mouth daily      glimepiride (AMARYL) 1 MG tablet Take 1 mg by mouth      isosorbide mononitrate (IMDUR) 30 MG 24 hr tablet Take 30 mg by mouth      latanoprost (XALATAN) 0.005 % ophthalmic solution Place 1 drop into both eyes At Bedtime      losartan (COZAAR) 50 MG tablet Take 1 tablet by mouth daily      metFORMIN (GLUCOPHAGE) 500 MG tablet Take 1 tablet before breakfast and 2 before supper      metoprolol tartrate (LOPRESSOR) 50 MG tablet TAKE 1 TABLET BY MOUTH IN THE MORNING AND 1 IN THE EVENING      nitroGLYcerin (NITROSTAT) 0.4 MG sublingual tablet Place 0.4 mg under the tongue every 5 minutes as needed for chest pain For chest pain place 1 tablet under the tongue every 5 minutes for 3 doses. If symptoms persist 5 minutes after 1st dose call 911.      sulfamethoxazole-trimethoprim (BACTRIM DS) 800-160 MG tablet Take 160 mg by mouth           Allergies   No Known Allergies

## 2023-08-20 NOTE — PROGRESS NOTES
6MS ADMISSION    D: Patient admitted/transferred from [facility] ED via stretcher for left eye redness and swelling     I: Upon arrival to the unit patient was oriented to room, unit, and call light. Patient s height, weight, and vital signs were obtained. Allergies reviewed and allergy band applied. Provider notified of patient s arrival on the unit. Adult AVS completed. Head to toe assessment completed. Education assessment completed. Care plan initiated.    A: Vital signs stable upon admission. Patient rates pain at 0 Two RN skin assessment completed Yes. Second RN was Ekram. No skin issue. Bed Algorithm can be found in PCS flow sheets (Support Surface Algorithm) and on IP [facility] NURSE RESOURCE TAB, was this used during this assessment?  yes. No pulsate mattress ordered   P: Continue to monitor patient s  eyes and intervene as needed. Continue with plan of care. Notify provider with any concerns or changes in patient status.       /79 (BP Location: Left arm)   Pulse 89   Temp 98.4  F (36.9  C) (Oral)   Resp 16   Ht 1.524 m (5')   Wt 62.6 kg (138 lb)   SpO2 95%   BMI 26.95 kg/m

## 2023-08-20 NOTE — PROGRESS NOTES
[facility] Note  -The patient was transferred from the [place] to the [facility].  The patient has a full admission note dated the date of service of today 8/9 by , medical resident and cosigned by my colleague Dr. Kirill Abdi, [facility] medicine attending.  -We will honor all the orders and consults by my admitting colleagues.  -I saw the patient at the bedside and answered all her questions.  -I also met with nursing staff and answered all their questions.  -We assumed the patient care at the [facility] and the patient has been added to the [facility] night service for redistribution in the morning.  -No billing for this note

## 2023-08-20 NOTE — PLAN OF CARE
Temp: 98.4  F (36.9  C) Temp src: Oral BP: 133/79 Pulse: 89   Resp: 16 SpO2: 95 % O2 Device: None (Room air)      2202-0912   No acute events overnight  AOx4. Denies CP, SOB, N/T  Voiding without difficulty. LBM 8/18  Pt denies pain this shift  Skin WDL, R eye swelling and periorbital redness  L PIV SL  Call light within reach at all times. Pt able to make needs known  Continue with POC.

## 2023-08-20 NOTE — CONSULTS
OPHTHALMOLOGY CONSULT NOTE  08/20/23    Patient: Saumya Holliday      ASSESSMENT/PLAN:     Assessment & Plan   # Acute dacryocystitis with abscess, right eye   # Preseptal cellulitis, right eye  Saumya Holliday is a 78 year old female with past ocular history of glaucoma and past medical history of DM2 who was admitted 8/19/23 due to concern for right pre-septal cellulitis secondary to acute dacrocystitis.   - One week of right eyelid swelling and mass  - VA 20/40 right eye, at baseline per pt  - IOP wnl (pt has a history of glaucoma, on max drops)  - EOMs and VF full  - Exam shows a fluctuant crusted erythematous mass at the right medial canthus, with surrounding erythema along the upper and lower eyelids.   - Admitted 8/19 from clinic for IV antibiotics s/p two days of IV Unasyn  - Abscess drained this morning spontaneously - additional purulent material extruded at bedside. Will likely continue to drain through superficial wound but no longer will require procedural drainage  - CT orbits repeated today with no signs of post-septal cellulitis  - exam stable today with VA 20/20, normal color and pupils, full EOM     Plan:   - Okay to transition to PO antibiotics and discharge today  - Start oral Augmentin 875-125 mg BID x 10 days  - Follow up in Oculoplastics clinic in 2 weeks (message sent to )  - Has follow up in September with Dr. Louis (regular eye doctor) for glaucoma management  - Return precautions dicussed including worsening pain, redness/swelling/decreased vision      Discussed with Oculoplastics fellow Velma Mcneal MD who agreed with this assessment and plan.     Deborah Mcmillan MD     HISTORY OF PRESENTING ILLNESS:     Saumya Holliday is a 78 year old female who was admitted 8/19/23 from ophthalmology clinic due to abscess on right upper eyelid superior to medial canthus. She was started on oral antibiotics Friday and then admitted Saturday for IV antibiotics. This morning, the abscess opened  up by itself and has been draining purulent material. She has improved pain of this area. No changes in vision or decreased vision. No double vision. No fevers. Daughter present at bedside.      10+ review of systems were otherwise negative except for that which has been stated above.      OCULAR/MEDICAL/SURGICAL HISTORIES:     Past Ocular History:   Past Ocular history:   - Glasses: Cheaters  - Contact lens wear: No  - Ocular medical history: Glaucoma, pseudophakia  - Ocular surgical history: Cataract surgery each eye 2022  - Current eye drops: alphagan, cosopt, and xalatan     Per daughter, last seen in August by regular eye doctor Dr. Louis   Per daughter glaucoma is worse in the left eye    Pertinent Systemic Medications:   Current Facility-Administered Medications   Medication     acetaminophen (TYLENOL) tablet 650 mg     ampicillin-sulbactam (UNASYN) 3 g vial to attach to  mL bag     aspirin (ASA) chewable tablet 81 mg     atorvastatin (LIPITOR) tablet 40 mg     brimonidine (ALPHAGAN) 0.2 % ophthalmic solution 1 drop     calcium carbonate (TUMS) chewable tablet 1,500 mg     glucose gel 15-30 g    Or     dextrose 50 % injection 25-50 mL    Or     glucagon injection 1 mg     dorzolamide-timolol (COSOPT) ophthalmic solution 1 drop     famotidine (PEPCID) tablet 40 mg     glimepiride (AMARYL) tablet 1 mg     isosorbide mononitrate (IMDUR) 24 hr tablet 30 mg     latanoprost (XALATAN) 0.005 % ophthalmic solution 1 drop     lidocaine (LMX4) cream     lidocaine 1 % 0.1-1 mL     losartan (COZAAR) tablet 50 mg     melatonin tablet 1 mg     metFORMIN (GLUCOPHAGE) tablet 1,000 mg     metFORMIN (GLUCOPHAGE) tablet 500 mg     metoprolol tartrate (LOPRESSOR) tablet 50 mg     ondansetron (ZOFRAN ODT) ODT tab 4 mg    Or     ondansetron (ZOFRAN) injection 4 mg     polyethylene glycol (MIRALAX) Packet 17 g     sodium chloride (PF) 0.9% PF flush 3 mL     sodium chloride (PF) 0.9% PF flush 3 mL         Past Medical  History: DM2, HTN    PAST SURGICAL HISTORY: cataract/glaucoma surgery both eyes      Family History:  No family history of glaucoma, AMD, or other ocular disease     Social History: lives with daughter in [place]    EXAMINATION:     Base Eye Exam       Visual Acuity (Snellen - Linear)         Right Left    Near sc 20/20 20/20              Tonometry (Tonopen, 2:36 PM)         Right Left    Pressure 18 19              Pupils         Pupils    Right PERRL    Left PERRL              Visual Fields         Left Right      Full    Restrictions Partial outer superior nasal deficiency               Extraocular Movement         Right Left     Full Full              Neuro/Psych       Oriented x3: Yes    Mood/Affect: Normal                  Additional Tests       Color         Right Left    Ishihara 8/8 8/8                  Slit Lamp and Fundus Exam       External Exam         Right Left    External Normal Normal              Slit Lamp Exam         Right Left    Lids/Lashes improving milan-orbital erythema, fluctuant abscess superior to medial canthus open and draining purulent material Blepharitis, mgd    Conjunctiva/Sclera trace injection White and quiet    Cornea PEE, mild inferior endopigment PEE, medial wound with possible retained lens fragment    Anterior Chamber Deep and quiet Deep and quiet    Iris Round and reactive Round and reactive, patchy iris tissue loss, TID medially    Lens PC IOL PC IOL    Anterior Vitreous Normal Normal                    Labs/Studies/Imaging Performed  CT orbits 8/20/23  Pre-septal fat stranding with fluctuant area superior to medial canthus with minimal material inside the abscess  No post-septal cellulitis       Deborah Mcmillan MD  Resident Physician, PGY-3  Department of Ophthalmology  08/20/23 2:18 PM

## 2023-08-20 NOTE — PROGRESS NOTES
[facility]    Medicine Progress Note - Hospitalist Service, GOLD TEAM 22    Date of Admission:  8/19/2023    Assessment & Plan      Saumya Holliday is a 78 year old female admitted on 8/19/2023. She has a PMHx of glaucoma, bilateral cataract surgery, HTN, DMII not on insulin, osteoporosis, CAD s/p stent RCA 2007,  of LAD, NANCY LCX 2016, moderate MR. Presenting with R eye swelling, concern for infection.     Today:  - Awaiting possible ophthalmology I&D and final recs for antimicrobial plan    # Preseptal cellulitis, right eye, stage I/II  # Hx glaucoma  # Hx bilateral cataract surgery 2022  Approx 1 week hx of R eye swelling with particular confluence of swelling near R medial canthus. No pain reported. No trauma to area. CT w soft tissue/skin thickening anterior to the right orbit with another area with peripheral enhancement along medial canthus of R eye. Of note, mild extraconal medial right orbital fat stranding, however no intraconal retro-orbital mass, fluid, or inflammation.  - Ophthalmology following, appreciate recs    - considering drainage of fluid pocket along medial canthus   - Not NPO, eating normally  - continue PTA eyedrops: alphagan, cosopt, and xalatan   - Unasyn 3g q6hrs  - if doesn't respond, consider adding antifungal in context of DM  - follow up on BCx    #HTN  - PTA metoprolol tartrate 50mg daily   - PTA losartan 50mg daily   - PTA Imdur 30mg daily     #DMII   Not on insulin. HbA1c 7/17 7.2.  - PTA glimepiride + metformin   - monitor BG    #Increased Alkaline Phosphatase  LFTs wnl. Suspect this is in context of known osteoporosis.     #Osteoporosis  - PTA alendronate 70mg  - PTA calcium carbonate 1500mg     #CAD s/p stent RCA 2007,  of LAD, NANCY LCX 2016  #Moderate MR  - PTA ASA 81mg     #GERD  - PTA famotidine 40mg daily        Diet: Combination Diet Regular Diet Adult    DVT Prophylaxis: Pneumatic Compression Devices  [place] Catheter: Not  present  Lines: None     Cardiac Monitoring: None  Code Status: Full Code      Clinically Significant Risk Factors Present on Admission                # Drug Induced Platelet Defect: home medication list includes an antiplatelet medication   # Hypertension: Home medication list includes antihypertensive(s)      # Overweight: Estimated body mass index is 26.95 kg/m  as calculated from the following:    Height as of this encounter: 1.524 m (5').    Weight as of this encounter: 62.6 kg (138 lb).              Disposition Plan      Expected Discharge Date: 08/21/2023                  Cecille Das MD  Hospitalist Service, GOLD TEAM 22  [facility]  Securely message with [email or web address] (more info)  Text page via [facility] Paging/Directory   See signed in provider for up to date coverage information  ______________________________________________________________________    Interval History   Nursing notes reviewed. No acute events overnight. No new complaints.      Physical Exam   Vital Signs: Temp: 98.2  F (36.8  C) Temp src: Oral BP: 139/72 Pulse: 82   Resp: 16 SpO2: 98 % O2 Device: None (Room air)    Weight: 138 lbs 0 oz    GENERAL: Healthy, alert and no distress  EYES: Eyes grossly normal to inspection.  No discharge or erythema, or obvious scleral/conjunctival abnormalities.  RESP: No audible wheeze, cough, or visible cyanosis.  No visible retractions or increased work of breathing.    SKIN: R eye orbital cellulitis   NEURO: Cranial nerves grossly intact.  Mentation and speech appropriate for age.  PSYCH: Mentation appears normal, affect normal/bright, judgement and insight intact, normal speech and appearance well-groomed.    Medical Decision Making   45 MINUTES SPENT BY ME on the date of service doing chart review, history, exam, documentation & further activities per the note.      Data     I have personally reviewed the following data over the past 24 hrs:    6.0  \   12.2    / 268     141 105 12.3 /  165 (H)   3.8 25 0.74 \     ALT: 22 AST: 21 AP: 120 (H) TBILI: 0.4   ALB: 3.6 TOT PROTEIN: 6.6 LIPASE: N/A       Imaging results reviewed over the past 24 hrs:   No results found for this or any previous visit (from the past 24 hour(s)).

## 2023-08-20 NOTE — UTILIZATION REVIEW
"  [facility] Utilization Review  Admission Status; Secondary Review Determination     Admission Date: 8/19/2023  2:29 PM      Under the authority of the Utilization Management Committee, the utilization review process indicated a secondary review on the above patient.  The review outcome is based on review of the medical records, discussions with staff, and applying clinical experience noted on the date of the review.        (X) Observation Status Appropriate - This patient does not meet hospital inpatient criteria and is placed in observation status. If this patient's primary payer is Medicare and was admitted as an inpatient, Condition Code 44 should be used and patient status changed to \"observation\".   () Observation Status concurrent Review           RATIONALE FOR DETERMINATION   78-year-old female with history of glaucoma, bilateral cataract surgery, hypertension, diabetes mellitus, osteoporosis, coronary artery disease status post stent, admitted with right thigh swelling.  Patient found to have preseptal cellulitis with right eye stage I/II, ophthalmology team considering drainage of fluid pocket along the medial canthus.  Started on IV Unasyn, blood cultures pending, complex patient who is ready for discharge, and switched to oral antibiotics, does not meet criteria for inpatient stay, after single midnight stay, recommend change to observation status, communicated to Dr. Das      The severity of illness, intensity of service provided, expected LOS make the care appropriate for observation status at this time.        The information on this document is developed by the utilization review team in order for the business office to ensure compliance.  This only denotes the appropriateness of proper admission status and does not reflect the quality of care rendered.         The definitions of Inpatient Status and Observation Status used in making the determination above are those provided in " the CMS Coverage Manual, Chapter 1 and Chapter 6, section 70.4.      Sincerely,       Aidan Varela MD  Physician Advisor  Utilization Review-[place]    Phone: [phone]

## 2023-08-20 NOTE — PLAN OF CARE
Report called to Verito TELLEZ on 6 Med surg.   Left via Ambulance with EMS and daughter at 2225; [facility] called and made aware.

## 2023-08-20 NOTE — PROGRESS NOTES
Aox4 on room air . Patient is SBA and able to make needs known effectively. She denies SOB, Pain . Nausea, vomiting.   LPIV SL    Plan to discharge today.          6MS DISCHARGE    D: Patient discharged to home at 1612 Patient accompanied by  transport via wheelchair.    I: Discharge prescriptions given to patient. All discharge medications and instructions reviewed with pt. Patient instructed to seek care if experiencing worsening symptoms, such as chest Pain. Other phone numbers to call with questions or concerns after discharge reviewed. IV'S removed. Education completed.    A: patient verbalized understanding of discharge medications and instructions. Prescribed home medications given to patient.  Belongings returned to patient.    P: Patient to follow-up on primary care within 7days

## 2023-08-22 ENCOUNTER — APPOINTMENT (OUTPATIENT)
Dept: INTERPRETER SERVICES | Facility: CLINIC | Age: 78
End: 2023-08-22
Payer: COMMERCIAL

## 2023-08-22 ENCOUNTER — PATIENT OUTREACH (OUTPATIENT)
Dept: CARE COORDINATION | Facility: CLINIC | Age: 78
End: 2023-08-22
Payer: COMMERCIAL

## 2023-08-23 ENCOUNTER — TELEPHONE (OUTPATIENT)
Dept: OPHTHALMOLOGY | Facility: CLINIC | Age: 78
End: 2023-08-23
Payer: COMMERCIAL

## 2023-08-23 NOTE — TELEPHONE ENCOUNTER
Spoke with patient's son, Tay, regarding  scheduling a New patient appointment for ED Follow up- Patient seen ED for an abscess and should be seen within 2 weeks-Per Dr. Mcneal-. Patient declined appointment offered for 8/29/23 at 9:15am as patient is being seen today 8/23/23 at 2pm with her primary provider for Eye Follow up. Patient was provided direct number if additional scheduling is required after Primary provider appointment today.-Per Patient's son, Tay, -Via  call but son speaks English

## 2023-08-23 NOTE — PROGRESS NOTES
Clinic Care Coordination Contact  [facility]: Post-Discharge Note  SITUATION                                                      Admission:    Admission Date: 08/19/23   Reason for Admission: Preseptal cellulitis of right eye  Discharge:   Discharge Date: 08/20/23  Discharge Diagnosis: # Preseptal cellulitis, right eye, stage I/II  # Hx glaucoma  # Hx bilateral cataract surgery 2022    BACKGROUND                                                      Per hospital discharge summary and inpatient provider notes:    Hospital Course  Saumya Holliday is a 78 year old female admitted on 8/19/2023. She has a PMHx of glaucoma, bilateral cataract surgery, HTN, DMII not on insulin, osteoporosis, CAD s/p stent RCA 2007,  of LAD, NANCY LCX 2016, moderate MR. Presenting with R eye swelling, concern for infection.      Today:  - Abscess drained spontaneously, No I&D required   - repeat orbital CT reviewed by Ophthalmology   - Safe for discharge on Augmentin BID x10 days per Ophtalmology               - patient has previous script that was never picked up; daughter will  this script for abx     # Preseptal cellulitis, right eye, stage I/II  # Hx glaucoma  # Hx bilateral cataract surgery 2022  Approx 1 week hx of R eye swelling with particular confluence of swelling near R medial canthus. No pain reported. No trauma to area. CT w soft tissue/skin thickening anterior to the right orbit with another area with peripheral enhancement along medial canthus of R eye. Of note, mild extraconal medial right orbital fat stranding, however no intraconal retro-orbital mass, fluid, or inflammation.  - Ophthalmology following, appreciate recs               - considering drainage of fluid pocket along medial canthus              - Not NPO, eating normally  - continue PTA eyedrops: alphagan, cosopt, and xalatan   - Unasyn 3g q6hrs  - if doesn't respond, consider adding antifungal in context of DM  - follow up on BCx     #HTN  - PTA  "metoprolol tartrate 50mg daily   - PTA losartan 50mg daily   - PTA Imdur 30mg daily      #DMII   Not on insulin. HbA1c 7/17 7.2.  - PTA glimepiride + metformin   - monitor BG     #Increased Alkaline Phosphatase  LFTs wnl. Suspect this is in context of known osteoporosis.      #Osteoporosis  - PTA alendronate 70mg  - PTA calcium carbonate 1500mg      #CAD s/p stent RCA 2007,  of LAD, NANCY LCX 2016  #Moderate MR  - PTA ASA 81mg      #GERD  - PTA famotidine 40mg daily    ASSESSMENT           Discharge Assessment  How are you doing now that you are home?: Called patient through , patient gave verbal consent to speak with son, Tay.  He reports his mom is doing much better, taking ATBx BID, \"drainage has slowed down a little bit more, but still a little yellowish fluid, swelling is down, puffy eye down and drying, and the pinkish rash all around the eye is getting better, almost back to usual her skin color.\"  Afebrile.  Denies eye pain, \"she never had any eye pain.\"  Reports Saumya has an eye appt with her \"regular eye doctor\" on 9/29/23 \"to have surgery for her glaucoma on the other eye without the infection.\"  RN advised scheduling an appt sooner than 9/29 with PCP for follow up and to ensure recovering as expected.  Son states he will call the eye clinic to schedule follow up appt next week.  How are your symptoms? (Red Flag symptoms escalate to triage hotline per guidelines): Improved  Do you feel your condition is stable enough to be safe at home until your provider visit?: Yes  Does the patient have their discharge instructions? : Yes  Does the patient have questions regarding their discharge instructions? : No  Were you started on any new medications or were there changes to any of your previous medications? : Yes  Does the patient have all of their medications?: Yes  Do you have questions regarding any of your medications? : No  Do you have all of your needed medical supplies or equipment " (DME)?  (i.e. oxygen tank, CPAP, cane, etc.): Yes  Discharge follow-up appointment scheduled within 14 calendar days? : No  Is patient agreeable to assistance with scheduling? : No (Son will call to schedule follow up appt with eye clinic for next week)         Post-op (Clinicians Only)  Did the patient have surgery or a procedure: No  Fever: No  Chills: No      PLAN                                                      Outpatient Plan:      Follow up with primary care provider, Physician No Ref-Primary, within 7 days for hospital follow- up. No follow up labs or test are needed.    No future appointments.      For any urgent concerns, please contact our 24 hour nurse triage line: [phone] ([phone])         Adriana Martino RN

## 2023-08-24 LAB
BACTERIA BLD CULT: NO GROWTH
BACTERIA BLD CULT: NO GROWTH